# Patient Record
Sex: MALE | Race: WHITE | NOT HISPANIC OR LATINO | Employment: OTHER | RURAL
[De-identification: names, ages, dates, MRNs, and addresses within clinical notes are randomized per-mention and may not be internally consistent; named-entity substitution may affect disease eponyms.]

---

## 2018-11-27 DIAGNOSIS — Z00.6 EXAMINATION FOR NORMAL COMPARISON OR CONTROL IN CLINICAL RESEARCH: Primary | ICD-10-CM

## 2018-11-28 ENCOUNTER — OFFICE VISIT (OUTPATIENT)
Dept: CARDIAC SURGERY | Facility: CLINIC | Age: 73
End: 2018-11-28

## 2018-11-28 VITALS
SYSTOLIC BLOOD PRESSURE: 200 MMHG | BODY MASS INDEX: 32.1 KG/M2 | DIASTOLIC BLOOD PRESSURE: 90 MMHG | HEIGHT: 72 IN | WEIGHT: 237 LBS | HEART RATE: 66 BPM

## 2018-11-28 DIAGNOSIS — R91.8 LUNG MASS: Primary | ICD-10-CM

## 2018-11-28 DIAGNOSIS — R59.0 MEDIASTINAL LYMPHADENOPATHY: ICD-10-CM

## 2018-11-28 PROCEDURE — 99204 OFFICE O/P NEW MOD 45 MIN: CPT | Performed by: THORACIC SURGERY (CARDIOTHORACIC VASCULAR SURGERY)

## 2018-11-28 RX ORDER — ERGOCALCIFEROL 1.25 MG/1
50000 CAPSULE ORAL WEEKLY
COMMUNITY

## 2018-11-28 RX ORDER — SIMVASTATIN 40 MG
40 TABLET ORAL NIGHTLY
COMMUNITY

## 2018-11-28 RX ORDER — LOSARTAN POTASSIUM 25 MG/1
50 TABLET ORAL DAILY
COMMUNITY

## 2018-11-28 NOTE — PROGRESS NOTES
11/28/2018  Patient Information  Marek Gudino                                                                                          [address] [facility] [address]   1945  'PCP/Referring Physician'  Provider, No Known  None  No ref. provider found    Chief Complaint   Patient presents with   • Consult     Np referred for a chest wall mass, complains of a cough and some shortness of breath.    • Shortness of Breath   • Cough       History of Present Illness:   The patient is a 73-year-old male who is referred from Dr. Ulloa for a right hilar and mediastinal mass.  This was positive on PET scan.  He also has a exophytic mass in his right kidney concerning for malignancy.  He has a previous history of colon cancer and had a colostomy approximately 11 years ago.  He denies any weight loss or fever and chills or other systemic symptoms.  Patient Active Problem List   Diagnosis   • Mediastinal lymphadenopathy   • Lung mass     Past Medical History:   Diagnosis Date   • Colorectal cancer (CMS/HCC)    • Dyslipidemia    • Hypertension    • Kidney mass    • Mediastinal lymphadenopathy    • Pneumonia      Past Surgical History:   Procedure Laterality Date   • BRONCHOSCOPY     • COLON RESECTION     • MEDIASTINOSCOPY     • RECTAL SURGERY         Current Outpatient Medications:   •  Cetirizine HCl 10 MG capsule, Take 10 mg by mouth Daily., Disp: , Rfl:   •  losartan (COZAAR) 25 MG tablet, Take 25 mg by mouth Daily., Disp: , Rfl:   •  Pyridoxine HCl (VITAMIN B6) 200 MG tablet, Take 200 mg by mouth Daily., Disp: , Rfl:   •  simvastatin (ZOCOR) 40 MG tablet, Take 40 mg by mouth Every Night., Disp: , Rfl:   •  vitamin D (ERGOCALCIFEROL) 62633 units capsule capsule, Take 50,000 Units by mouth 1 (One) Time Per Week., Disp: , Rfl:   Allergies   Allergen Reactions   • Contrast Dye Other (See Comments)     States that it makes him very jittery. Shakes badly     Social History     Socioeconomic History   • Marital  status:      Spouse name: Not on file   • Number of children: 1   • Years of education: Not on file   • Highest education level: Not on file   Social Needs   • Financial resource strain: Not on file   • Food insecurity - worry: Not on file   • Food insecurity - inability: Not on file   • Transportation needs - medical: Not on file   • Transportation needs - non-medical: Not on file   Occupational History   • Occupation: FARMER   Tobacco Use   • Smoking status: Never Smoker   • Smokeless tobacco: Never Used   Substance and Sexual Activity   • Alcohol use: No     Frequency: Never   • Drug use: No   • Sexual activity: Defer   Other Topics Concern   • Not on file   Social History Narrative    Lives in [facility] with his wife.     Family History   Problem Relation Age of Onset   • Lung cancer Mother    • Lung cancer Father      Review of Systems   Constitution: Negative for chills, fever, malaise/fatigue, night sweats and weight loss.   HENT: Negative for hearing loss, odynophagia and sore throat.    Cardiovascular: Positive for dyspnea on exertion and syncope. Negative for chest pain, leg swelling, orthopnea and palpitations.   Respiratory: Positive for cough and shortness of breath. Negative for hemoptysis.    Endocrine: Negative for cold intolerance, heat intolerance, polydipsia, polyphagia and polyuria.   Hematologic/Lymphatic: Does not bruise/bleed easily.   Skin: Negative for itching and rash.   Musculoskeletal: Positive for joint pain. Negative for joint swelling and myalgias.   Gastrointestinal: Negative for abdominal pain, constipation, diarrhea, hematemesis, hematochezia, melena, nausea and vomiting.   Genitourinary: Negative for dysuria, frequency and hematuria.   Neurological: Negative for focal weakness, headaches, numbness and seizures.   Psychiatric/Behavioral: Negative for suicidal ideas.   Allergic/Immunologic: Positive for environmental allergies.   All other systems reviewed and are  "negative.    Vitals:    11/28/18 0835   BP: (!) 200/90   BP Location: Right arm   Patient Position: Sitting   Pulse: 66   Weight: 108 kg (237 lb)   Height: 182.9 cm (72\")      Physical Exam   Constitutional: He is oriented to person, place, and time. He appears well-developed and well-nourished. No distress.   HENT:   Head: Normocephalic.   Eyes: EOM are normal. Pupils are equal, round, and reactive to light.   Neck: Normal range of motion. Carotid bruit is not present. No thyromegaly present.   Cardiovascular: Normal rate and regular rhythm. Exam reveals no gallop and no friction rub.   No murmur heard.  Pulmonary/Chest: He has no wheezes. He has no rales.   Abdominal: Soft. Bowel sounds are normal. He exhibits no distension and no mass. There is no hepatomegaly. There is no tenderness.   Musculoskeletal: Normal range of motion. He exhibits no deformity.   Neurological: He is alert and oriented to person, place, and time. He has normal strength. No cranial nerve deficit or sensory deficit.   Skin: No bruising and no petechiae noted. No cyanosis. Nails show no clubbing.   Psychiatric: He has a normal mood and affect.       Labs/Imaging:  I obtained and reviewed medical records from Dr. Ulloa including the PET scan demonstrating the mass in his right kidney, which is certainly concerning for malignancy.      Assessment/Plan:   The patient is a 73-year-old male who was referred for a lung mass and mediastinal lymphadenopathy. I have obtained and reviewed his PET scan and gone over itin detail.  He has a mass in his right kidney, which is certainly concerning for malignancy.  He will be seen at [facility] for this.  I have reviewed his PET scan.  He has a right hilar hypermetabolic area in the mediastinum as well.  This certainly is concerning for malignancy.  We will proceed with an EBUS study to further assess this.  He has had a mediastinoscopy in the past by me.  I discussed the procedure, risks, and " alternatives.  He appears to be fully informed and desires to proceed.    Patient Active Problem List   Diagnosis   • Mediastinal lymphadenopathy   • Lung mass     CC: MD Darvin Linn MD Debbie Moore, , editing for Gianni Cruz M.D.    I, Gianni Cruz MD, have read and agree with the editing done by Shahnaz Cuadra, .

## 2018-11-29 DIAGNOSIS — Z00.6 EXAMINATION FOR NORMAL COMPARISON FOR CLINICAL RESEARCH: Primary | ICD-10-CM

## 2018-11-29 PROBLEM — R59.0 MEDIASTINAL LYMPHADENOPATHY: Status: ACTIVE | Noted: 2018-11-29

## 2018-11-29 PROBLEM — R91.8 LUNG MASS: Status: ACTIVE | Noted: 2018-11-29

## 2018-12-03 ENCOUNTER — PREP FOR SURGERY (OUTPATIENT)
Dept: OTHER | Facility: HOSPITAL | Age: 73
End: 2018-12-03

## 2018-12-03 DIAGNOSIS — R59.0 MEDIASTINAL LYMPHADENOPATHY: Primary | ICD-10-CM

## 2018-12-04 ENCOUNTER — HOSPITAL ENCOUNTER (OUTPATIENT)
Dept: GENERAL RADIOLOGY | Facility: HOSPITAL | Age: 73
Discharge: HOME | End: 2018-12-04
Admitting: PHYSICIAN ASSISTANT

## 2018-12-04 ENCOUNTER — APPOINTMENT (OUTPATIENT)
Dept: PREADMISSION TESTING | Facility: HOSPITAL | Age: 73
End: 2018-12-04

## 2018-12-04 VITALS — BODY MASS INDEX: 32.07 KG/M2 | HEIGHT: 72 IN | WEIGHT: 236.8 LBS

## 2018-12-04 DIAGNOSIS — R59.0 MEDIASTINAL LYMPHADENOPATHY: ICD-10-CM

## 2018-12-04 LAB
ALBUMIN SERPL-MCNC: 4.4 G/DL (ref 3.2–4.8)
ALBUMIN/GLOB SERPL: 2.2 G/DL (ref 1.5–2.5)
ALP SERPL-CCNC: 78 U/L (ref 25–100)
ALT SERPL W P-5'-P-CCNC: 28 U/L (ref 7–40)
ANION GAP SERPL CALCULATED.3IONS-SCNC: 6 MMOL/L (ref 3–11)
APTT PPP: 30.3 SECONDS (ref 24–31)
AST SERPL-CCNC: 28 U/L (ref 0–33)
BILIRUB SERPL-MCNC: 0.8 MG/DL (ref 0.3–1.2)
BUN BLD-MCNC: 14 MG/DL (ref 9–23)
BUN/CREAT SERPL: 11.7 (ref 7–25)
CALCIUM SPEC-SCNC: 9 MG/DL (ref 8.7–10.4)
CHLORIDE SERPL-SCNC: 109 MMOL/L (ref 99–109)
CO2 SERPL-SCNC: 23 MMOL/L (ref 20–31)
CREAT BLD-MCNC: 1.2 MG/DL (ref 0.6–1.3)
DEPRECATED RDW RBC AUTO: 44.1 FL (ref 37–54)
ERYTHROCYTE [DISTWIDTH] IN BLOOD BY AUTOMATED COUNT: 13.4 % (ref 11.3–14.5)
GFR SERPL CREATININE-BSD FRML MDRD: 60 ML/MIN/1.73
GLOBULIN UR ELPH-MCNC: 2 GM/DL
GLUCOSE BLD-MCNC: 101 MG/DL (ref 70–100)
HBA1C MFR BLD: 6.2 % (ref 4.8–5.6)
HCT VFR BLD AUTO: 43.1 % (ref 38.9–50.9)
HGB BLD-MCNC: 14.4 G/DL (ref 13.1–17.5)
INR PPP: 1.03 (ref 0.91–1.09)
MCH RBC QN AUTO: 30.3 PG (ref 27–31)
MCHC RBC AUTO-ENTMCNC: 33.4 G/DL (ref 32–36)
MCV RBC AUTO: 90.5 FL (ref 80–99)
PLATELET # BLD AUTO: 196 10*3/MM3 (ref 150–450)
PMV BLD AUTO: 10.3 FL (ref 6–12)
POTASSIUM BLD-SCNC: 4.1 MMOL/L (ref 3.5–5.5)
PROT SERPL-MCNC: 6.4 G/DL (ref 5.7–8.2)
PROTHROMBIN TIME: 10.8 SECONDS (ref 9.6–11.5)
RBC # BLD AUTO: 4.76 10*6/MM3 (ref 4.2–5.76)
SODIUM BLD-SCNC: 138 MMOL/L (ref 132–146)
WBC NRBC COR # BLD: 5.95 10*3/MM3 (ref 3.5–10.8)

## 2018-12-04 PROCEDURE — 71046 X-RAY EXAM CHEST 2 VIEWS: CPT

## 2018-12-04 PROCEDURE — 36415 COLL VENOUS BLD VENIPUNCTURE: CPT

## 2018-12-04 PROCEDURE — 85610 PROTHROMBIN TIME: CPT | Performed by: PHYSICIAN ASSISTANT

## 2018-12-04 PROCEDURE — 83036 HEMOGLOBIN GLYCOSYLATED A1C: CPT | Performed by: PHYSICIAN ASSISTANT

## 2018-12-04 PROCEDURE — 80053 COMPREHEN METABOLIC PANEL: CPT | Performed by: PHYSICIAN ASSISTANT

## 2018-12-04 PROCEDURE — 85730 THROMBOPLASTIN TIME PARTIAL: CPT | Performed by: PHYSICIAN ASSISTANT

## 2018-12-04 PROCEDURE — 85027 COMPLETE CBC AUTOMATED: CPT | Performed by: PHYSICIAN ASSISTANT

## 2018-12-05 ENCOUNTER — ANESTHESIA EVENT (OUTPATIENT)
Dept: GASTROENTEROLOGY | Facility: HOSPITAL | Age: 73
End: 2018-12-05

## 2018-12-05 ENCOUNTER — ANESTHESIA (OUTPATIENT)
Dept: GASTROENTEROLOGY | Facility: HOSPITAL | Age: 73
End: 2018-12-05

## 2018-12-05 ENCOUNTER — HOSPITAL ENCOUNTER (OUTPATIENT)
Facility: HOSPITAL | Age: 73
Setting detail: HOSPITAL OUTPATIENT SURGERY
Discharge: HOME | End: 2018-12-05
Attending: THORACIC SURGERY (CARDIOTHORACIC VASCULAR SURGERY) | Admitting: THORACIC SURGERY (CARDIOTHORACIC VASCULAR SURGERY)

## 2018-12-05 ENCOUNTER — APPOINTMENT (OUTPATIENT)
Dept: GENERAL RADIOLOGY | Facility: HOSPITAL | Age: 73
End: 2018-12-05

## 2018-12-05 VITALS
RESPIRATION RATE: 18 BRPM | TEMPERATURE: 97.2 F | DIASTOLIC BLOOD PRESSURE: 76 MMHG | HEART RATE: 60 BPM | OXYGEN SATURATION: 90 % | SYSTOLIC BLOOD PRESSURE: 134 MMHG

## 2018-12-05 DIAGNOSIS — R59.0 MEDIASTINAL LYMPHADENOPATHY: ICD-10-CM

## 2018-12-05 PROCEDURE — C1726 CATH, BAL DIL, NON-VASCULAR: HCPCS | Performed by: THORACIC SURGERY (CARDIOTHORACIC VASCULAR SURGERY)

## 2018-12-05 PROCEDURE — 25010000002 DEXAMETHASONE PER 1 MG: Performed by: NURSE ANESTHETIST, CERTIFIED REGISTERED

## 2018-12-05 PROCEDURE — 25010000002 FENTANYL CITRATE (PF) 100 MCG/2ML SOLUTION: Performed by: NURSE ANESTHETIST, CERTIFIED REGISTERED

## 2018-12-05 PROCEDURE — 25010000002 ONDANSETRON PER 1 MG: Performed by: NURSE ANESTHETIST, CERTIFIED REGISTERED

## 2018-12-05 PROCEDURE — 87070 CULTURE OTHR SPECIMN AEROBIC: CPT | Performed by: THORACIC SURGERY (CARDIOTHORACIC VASCULAR SURGERY)

## 2018-12-05 PROCEDURE — S0260 H&P FOR SURGERY: HCPCS | Performed by: THORACIC SURGERY (CARDIOTHORACIC VASCULAR SURGERY)

## 2018-12-05 PROCEDURE — 25010000002 PROPOFOL 10 MG/ML EMULSION: Performed by: NURSE ANESTHETIST, CERTIFIED REGISTERED

## 2018-12-05 PROCEDURE — 71045 X-RAY EXAM CHEST 1 VIEW: CPT

## 2018-12-05 PROCEDURE — 88173 CYTOPATH EVAL FNA REPORT: CPT | Performed by: THORACIC SURGERY (CARDIOTHORACIC VASCULAR SURGERY)

## 2018-12-05 PROCEDURE — 25010000002 NEOSTIGMINE 10 MG/10ML SOLUTION: Performed by: NURSE ANESTHETIST, CERTIFIED REGISTERED

## 2018-12-05 PROCEDURE — 31652 BRONCH EBUS SAMPLNG 1/2 NODE: CPT | Performed by: THORACIC SURGERY (CARDIOTHORACIC VASCULAR SURGERY)

## 2018-12-05 PROCEDURE — 88112 CYTOPATH CELL ENHANCE TECH: CPT | Performed by: THORACIC SURGERY (CARDIOTHORACIC VASCULAR SURGERY)

## 2018-12-05 RX ORDER — SODIUM CHLORIDE 0.9 % (FLUSH) 0.9 %
1-10 SYRINGE (ML) INJECTION AS NEEDED
Status: DISCONTINUED | OUTPATIENT
Start: 2018-12-05 | End: 2018-12-05 | Stop reason: HOSPADM

## 2018-12-05 RX ORDER — LIDOCAINE HYDROCHLORIDE 10 MG/ML
0.5 INJECTION, SOLUTION EPIDURAL; INFILTRATION; INTRACAUDAL; PERINEURAL ONCE AS NEEDED
Status: DISCONTINUED | OUTPATIENT
Start: 2018-12-05 | End: 2018-12-05 | Stop reason: HOSPADM

## 2018-12-05 RX ORDER — SODIUM CHLORIDE 0.9 % (FLUSH) 0.9 %
3 SYRINGE (ML) INJECTION EVERY 12 HOURS SCHEDULED
Status: DISCONTINUED | OUTPATIENT
Start: 2018-12-05 | End: 2018-12-05 | Stop reason: HOSPADM

## 2018-12-05 RX ORDER — PROMETHAZINE HYDROCHLORIDE 25 MG/1
25 SUPPOSITORY RECTAL ONCE AS NEEDED
Status: DISCONTINUED | OUTPATIENT
Start: 2018-12-05 | End: 2018-12-05 | Stop reason: HOSPADM

## 2018-12-05 RX ORDER — PROMETHAZINE HYDROCHLORIDE 25 MG/ML
6.25 INJECTION, SOLUTION INTRAMUSCULAR; INTRAVENOUS ONCE AS NEEDED
Status: DISCONTINUED | OUTPATIENT
Start: 2018-12-05 | End: 2018-12-05 | Stop reason: HOSPADM

## 2018-12-05 RX ORDER — FAMOTIDINE 20 MG/1
20 TABLET, FILM COATED ORAL
Status: DISCONTINUED | OUTPATIENT
Start: 2018-12-05 | End: 2018-12-05 | Stop reason: HOSPADM

## 2018-12-05 RX ORDER — SODIUM CHLORIDE 9 MG/ML
9 INJECTION, SOLUTION INTRAVENOUS CONTINUOUS
Status: DISCONTINUED | OUTPATIENT
Start: 2018-12-05 | End: 2018-12-05 | Stop reason: HOSPADM

## 2018-12-05 RX ORDER — FENTANYL CITRATE 50 UG/ML
25 INJECTION, SOLUTION INTRAMUSCULAR; INTRAVENOUS
Status: DISCONTINUED | OUTPATIENT
Start: 2018-12-05 | End: 2018-12-05 | Stop reason: HOSPADM

## 2018-12-05 RX ORDER — FENTANYL CITRATE 50 UG/ML
INJECTION, SOLUTION INTRAMUSCULAR; INTRAVENOUS AS NEEDED
Status: DISCONTINUED | OUTPATIENT
Start: 2018-12-05 | End: 2018-12-05 | Stop reason: SURG

## 2018-12-05 RX ORDER — LIDOCAINE HYDROCHLORIDE 10 MG/ML
INJECTION, SOLUTION EPIDURAL; INFILTRATION; INTRACAUDAL; PERINEURAL AS NEEDED
Status: DISCONTINUED | OUTPATIENT
Start: 2018-12-05 | End: 2018-12-05 | Stop reason: SURG

## 2018-12-05 RX ORDER — NEOSTIGMINE METHYLSULFATE 1 MG/ML
INJECTION, SOLUTION INTRAVENOUS AS NEEDED
Status: DISCONTINUED | OUTPATIENT
Start: 2018-12-05 | End: 2018-12-05 | Stop reason: SURG

## 2018-12-05 RX ORDER — LABETALOL HYDROCHLORIDE 5 MG/ML
5 INJECTION, SOLUTION INTRAVENOUS
Status: DISCONTINUED | OUTPATIENT
Start: 2018-12-05 | End: 2018-12-05 | Stop reason: HOSPADM

## 2018-12-05 RX ORDER — PROMETHAZINE HYDROCHLORIDE 25 MG/1
25 TABLET ORAL ONCE AS NEEDED
Status: DISCONTINUED | OUTPATIENT
Start: 2018-12-05 | End: 2018-12-05 | Stop reason: HOSPADM

## 2018-12-05 RX ORDER — IPRATROPIUM BROMIDE AND ALBUTEROL SULFATE 2.5; .5 MG/3ML; MG/3ML
3 SOLUTION RESPIRATORY (INHALATION) ONCE AS NEEDED
Status: DISCONTINUED | OUTPATIENT
Start: 2018-12-05 | End: 2018-12-05 | Stop reason: HOSPADM

## 2018-12-05 RX ORDER — ATRACURIUM BESYLATE 10 MG/ML
INJECTION, SOLUTION INTRAVENOUS AS NEEDED
Status: DISCONTINUED | OUTPATIENT
Start: 2018-12-05 | End: 2018-12-05 | Stop reason: SURG

## 2018-12-05 RX ORDER — ONDANSETRON 2 MG/ML
INJECTION INTRAMUSCULAR; INTRAVENOUS AS NEEDED
Status: DISCONTINUED | OUTPATIENT
Start: 2018-12-05 | End: 2018-12-05 | Stop reason: SURG

## 2018-12-05 RX ORDER — SODIUM CHLORIDE, SODIUM LACTATE, POTASSIUM CHLORIDE, CALCIUM CHLORIDE 600; 310; 30; 20 MG/100ML; MG/100ML; MG/100ML; MG/100ML
9 INJECTION, SOLUTION INTRAVENOUS CONTINUOUS PRN
Status: DISCONTINUED | OUTPATIENT
Start: 2018-12-05 | End: 2018-12-05 | Stop reason: HOSPADM

## 2018-12-05 RX ORDER — PROPOFOL 10 MG/ML
VIAL (ML) INTRAVENOUS AS NEEDED
Status: DISCONTINUED | OUTPATIENT
Start: 2018-12-05 | End: 2018-12-05 | Stop reason: SURG

## 2018-12-05 RX ORDER — DEXAMETHASONE SODIUM PHOSPHATE 10 MG/ML
INJECTION INTRAMUSCULAR; INTRAVENOUS AS NEEDED
Status: DISCONTINUED | OUTPATIENT
Start: 2018-12-05 | End: 2018-12-05 | Stop reason: SURG

## 2018-12-05 RX ORDER — GLYCOPYRROLATE 0.2 MG/ML
INJECTION INTRAMUSCULAR; INTRAVENOUS AS NEEDED
Status: DISCONTINUED | OUTPATIENT
Start: 2018-12-05 | End: 2018-12-05 | Stop reason: SURG

## 2018-12-05 RX ORDER — ONDANSETRON 2 MG/ML
4 INJECTION INTRAMUSCULAR; INTRAVENOUS ONCE AS NEEDED
Status: DISCONTINUED | OUTPATIENT
Start: 2018-12-05 | End: 2018-12-05 | Stop reason: HOSPADM

## 2018-12-05 RX ADMIN — PROPOFOL 200 MG: 10 INJECTION, EMULSION INTRAVENOUS at 10:36

## 2018-12-05 RX ADMIN — FENTANYL CITRATE 50 MCG: 50 INJECTION, SOLUTION INTRAMUSCULAR; INTRAVENOUS at 10:40

## 2018-12-05 RX ADMIN — DEXAMETHASONE SODIUM PHOSPHATE 8 MG: 10 INJECTION INTRAMUSCULAR; INTRAVENOUS at 10:45

## 2018-12-05 RX ADMIN — NEOSTIGMINE METHYLSULFATE 3 MG: 1 INJECTION, SOLUTION INTRAVENOUS at 11:01

## 2018-12-05 RX ADMIN — ATRACURIUM BESYLATE 30 MG: 10 INJECTION, SOLUTION INTRAVENOUS at 10:36

## 2018-12-05 RX ADMIN — SODIUM CHLORIDE 9 ML/HR: 9 INJECTION, SOLUTION INTRAVENOUS at 09:06

## 2018-12-05 RX ADMIN — GLYCOPYRROLATE 0.4 MG: 0.2 INJECTION, SOLUTION INTRAMUSCULAR; INTRAVENOUS at 11:01

## 2018-12-05 RX ADMIN — ONDANSETRON 4 MG: 2 INJECTION INTRAMUSCULAR; INTRAVENOUS at 11:00

## 2018-12-05 RX ADMIN — LIDOCAINE HYDROCHLORIDE 50 MG: 10 INJECTION, SOLUTION EPIDURAL; INFILTRATION; INTRACAUDAL; PERINEURAL at 10:36

## 2018-12-05 RX ADMIN — SODIUM CHLORIDE, POTASSIUM CHLORIDE, SODIUM LACTATE AND CALCIUM CHLORIDE: 600; 310; 30; 20 INJECTION, SOLUTION INTRAVENOUS at 08:52

## 2018-12-05 RX ADMIN — FAMOTIDINE 20 MG: 20 TABLET ORAL at 09:03

## 2018-12-05 NOTE — OP NOTE
Operative Report      Marek Gudino    : 1945  MRN: [id number]  [facility]: [id number]      Date:18      Preop Diagnosis: Mediastinal lymphadenopathy, right lower lobe airspace disease      Postop Diagnosis: Mediastinal lymphadenopathy, right lower lobe airspace disease      Procedure:   #1 bronchoscopy with brushing of the superior segment right lower lobe  #2 endobronchial ultrasound with biopsy of 4R mediastinal lymph node      Surgeon: Gurvinder Grajeda MD      Assistant: None      Indications: 73-year-old  male with a known right renal mass right lower lobe airspace disease as enlarged mediastinal nodes near the right hilum on CT scan.  PET scan reveals these nodes to be metabolically active..  It should be noted that the patient has had a previous mediastinoscopy and bronchoscopy for a similar problem 10 years ago.  No malignancy was noted at that time.      Operative Findings: Narrowing of the superior segment right lower lobe takeoff with friability of the mucosa was noted.  Washings and brushings were obtained from this area.  A level IVR lymph node was identified and multiple needle biopsies were obtained.      EBL:5cc      Operative Narrative: The patient was brought to the endoscopy suite and prepared for intervention in the usual manner.  A timeout was called.  The patient's identity, the proposed procedure, and the availability of appropriate equipment and personnel was verified.  Following inhalation endotracheal anesthesia the fiberoptic bronchoscope was introduced.  The trachea was normal the sukumar was sharp and in the midline. The right mainstem bronchus, bronchus intermedius, right upper lobe, right middle lobe, and right lower lobe with all respective segmental takeoffs was inspected.  The superior segment takeoff of the right lower lobe appeared to be narrowed.  Washings, cytology brushing, and microbiology brushing were obtained from the orifice and from the superior segment  of the right lower lobe.  Washings were sent for cultures and cytology brushings were sent for cultures and cytology.  Right upper lobe right middle lobe and remaining segments of the right lower lobe were normal.  The scope was pulled back to the sukumar and passed down the left side.  The left mainstem, left upper lobe, lingula, and left lower lobe with all segmental takeoffs was inspected and noted to be normal.  The bronchoscope was removed. The endobronchial ultrasound scope was introduced.  Survey of the paratracheal regions, the subcarinal region, and level 10 regions bilaterally revealed the enlarged lymph node at level IVR..  Needle introduction was performed and multiple needle biopsies of the forearm lymph node (10×4 passages) were performed.  The specimens were sent as previously described.  Inspection revealed no evidence of bleeding.  The scope was withdrawn.  The patient was awakened and sent to recovery in stable condition.          Gurvinder Grajeda MD  CTSurgery  12/05/18   11:38 AM

## 2018-12-05 NOTE — H&P
CTS H&P        History of Present Illness:   The patient is a 73-year-old male who is referred from Dr. Ulloa for a right hilar and mediastinal mass.  This was positive on PET scan.  He also has a exophytic mass in his right kidney concerning for malignancy.  He has a previous history of colon cancer and had a colostomy approximately 11 years ago.  He denies any weight loss or fever and chills or other systemic symptoms.          Patient Active Problem List   Diagnosis   • Mediastinal lymphadenopathy   • Lung mass      Medical History        Past Medical History:   Diagnosis Date   • Colorectal cancer (CMS/HCC)     • Dyslipidemia     • Hypertension     • Kidney mass     • Mediastinal lymphadenopathy     • Pneumonia           Surgical History         Past Surgical History:   Procedure Laterality Date   • BRONCHOSCOPY       • COLON RESECTION       • MEDIASTINOSCOPY       • RECTAL SURGERY                Current Outpatient Medications:   •  Cetirizine HCl 10 MG capsule, Take 10 mg by mouth Daily., Disp: , Rfl:   •  losartan (COZAAR) 25 MG tablet, Take 25 mg by mouth Daily., Disp: , Rfl:   •  Pyridoxine HCl (VITAMIN B6) 200 MG tablet, Take 200 mg by mouth Daily., Disp: , Rfl:   •  simvastatin (ZOCOR) 40 MG tablet, Take 40 mg by mouth Every Night., Disp: , Rfl:   •  vitamin D (ERGOCALCIFEROL) 79980 units capsule capsule, Take 50,000 Units by mouth 1 (One) Time Per Week., Disp: , Rfl:         Allergies   Allergen Reactions   • Contrast Dye Other (See Comments)       States that it makes him very jittery. Shakes badly      Social History               Socioeconomic History   • Marital status:        Spouse name: Not on file   • Number of children: 1   • Years of education: Not on file   • Highest education level: Not on file   Social Needs   • Financial resource strain: Not on file   • Food insecurity - worry: Not on file   • Food insecurity -  inability: Not on file   • Transportation needs - medical: Not on file   • Transportation needs - non-medical: Not on file   Occupational History   • Occupation: FARMER   Tobacco Use   • Smoking status: Never Smoker   • Smokeless tobacco: Never Used   Substance and Sexual Activity   • Alcohol use: No       Frequency: Never   • Drug use: No   • Sexual activity: Defer   Other Topics Concern   • Not on file   Social History Narrative     Lives in [facility] with his wife.               Family History   Problem Relation Age of Onset   • Lung cancer Mother     • Lung cancer Father        Review of Systems   Constitution: Negative for chills, fever, malaise/fatigue, night sweats and weight loss.   HENT: Negative for hearing loss, odynophagia and sore throat.    Cardiovascular: Positive for dyspnea on exertion and syncope. Negative for chest pain, leg swelling, orthopnea and palpitations.   Respiratory: Positive for cough and shortness of breath. Negative for hemoptysis.    Endocrine: Negative for cold intolerance, heat intolerance, polydipsia, polyphagia and polyuria.   Hematologic/Lymphatic: Does not bruise/bleed easily.   Skin: Negative for itching and rash.   Musculoskeletal: Positive for joint pain. Negative for joint swelling and myalgias.   Gastrointestinal: Negative for abdominal pain, constipation, diarrhea, hematemesis, hematochezia, melena, nausea and vomiting.   Genitourinary: Negative for dysuria, frequency and hematuria.   Neurological: Negative for focal weakness, headaches, numbness and seizures.   Psychiatric/Behavioral: Negative for suicidal ideas.   Allergic/Immunologic: Positive for environmental allergies.   All other systems reviewed and are negative.              Physical Exam   /102  P 61  R 16  Constitutional: He is oriented to person, place, and time. He appears well-developed and well-nourished. No distress.   HENT:   Head: Normocephalic.   Eyes: EOM are normal. Pupils are equal, round,  and reactive to light.   Neck: Normal range of motion. Carotid bruit is not present. No thyromegaly present.   Cardiovascular: Normal rate and regular rhythm. Exam reveals no gallop and no friction rub.   No murmur heard.  Pulmonary/Chest: He has no wheezes. He has no rales.   Abdominal: Soft. Bowel sounds are normal. He exhibits no distension and no mass. There is no hepatomegaly. There is no tenderness.   Musculoskeletal: Normal range of motion. He exhibits no deformity.   Neurological: He is alert and oriented to person, place, and time. He has normal strength. No cranial nerve deficit or sensory deficit.       Assessment:   Mediastinal adenopathy (metabollically active)  Right renal mass  History of rectal carcinoma    Plan: proceed with bronchoscopy and EBUS.  I have discussed the procedure in detail with patient.  He understands and agrees.      Gurvinder Grajeda MD  CTSurgery  12/05/18   10:23 AM

## 2018-12-05 NOTE — ANESTHESIA PREPROCEDURE EVALUATION
Anesthesia Evaluation     Patient summary reviewed and Nursing notes reviewed   NPO Solid Status: > 8 hours  NPO Liquid Status: > 8 hours           Airway   Mallampati: I  TM distance: >3 FB  Neck ROM: full  No difficulty expected  Dental      Pulmonary    (+) pneumonia resolved ,   (-) asthma, shortness of breath, recent URI, not a smoker    ROS comment: LUNG mass on PET scan after pneumonia  Cardiovascular     (+) hypertension, hyperlipidemia,   (-) past MI, CAD, dysrhythmias, angina, cardiac stents, CABG      Neuro/Psych  (-) seizures, CVA  GI/Hepatic/Renal/Endo    Renal disease: kidney N MASS w/u at [facility] R     Musculoskeletal     Abdominal    Substance History      OB/GYN          Other                        Anesthesia Plan    ASA 3     general     intravenous induction   Anesthetic plan, all risks, benefits, and alternatives have been provided, discussed and informed consent has been obtained with: patient.    Plan discussed with CRNA.

## 2018-12-05 NOTE — ANESTHESIA POSTPROCEDURE EVALUATION
Patient: Marek Gudino    Procedure Summary     Date:  12/05/18 Room / Location:   [facility] /  [facility]    Anesthesia Start:  1030 Anesthesia Stop:      Procedure:  BRONCHOSCOPY WITH ENDOBRONCHIAL ULTRASOUND biopsies (N/A Bronchus) Diagnosis:       Mediastinal lymphadenopathy      (Mediastinal lymphadenopathy [R59.0])    Surgeon:  Gurvinder Grajeda MD Provider:      Anesthesia Type:  general ASA Status:  3          Anesthesia Type: general  Last vitals  BP   115/86 (12/05/18 1113)   Temp   97.6 °F (36.4 °C) (12/05/18 1113)   Pulse   67 (12/05/18 1113)   Resp   21 (12/05/18 1113)     SpO2   97 % (12/05/18 1113)     Post Anesthesia Care and Evaluation    Patient location during evaluation: PACU  Patient participation: complete - patient participated  Level of consciousness: awake and responsive to verbal stimuli  Pain score: 0  Pain management: adequate  Airway patency: patent  Anesthetic complications: No anesthetic complications  PONV Status: none  Cardiovascular status: stable  Respiratory status: acceptable, nasal cannula and spontaneous ventilation  Hydration status: stable    Comments: Pt transferred to PACU with O2. Vital signs stable. Report to PACU RN and care accepted.

## 2018-12-05 NOTE — ANESTHESIA PROCEDURE NOTES
Airway  Urgency: elective    Airway not difficult    General Information and Staff    Patient location during procedure: OR  CRNA: Susan Dang CRNA    Indications and Patient Condition  Indications for airway management: airway protection    Preoxygenated: yes  MILS maintained throughout  Mask difficulty assessment: 2 - vent by mask + OA or adjuvant +/- NMBA    Final Airway Details  Final airway type: endotracheal airway      Successful airway: ETT  Cuffed: yes   Successful intubation technique: direct laryngoscopy  Endotracheal tube insertion site: oral  Blade: Lewis  Blade size: 2  ETT size (mm): 9.0  Cormack-Lehane Classification: grade I - full view of glottis  Placement verified by: chest auscultation and capnometry   Cuff volume (mL): 6  Measured from: lips  ETT to lips (cm): 22  Number of attempts at approach: 1    Additional Comments  Pt to ENDO 2.  Pt supine on stretcher. Bilateral arms to side.  ASA monitors placed. Pre-O2 with 100% Oxygen. SIVI. Atraumatic intubation.

## 2018-12-06 LAB
LAB AP CASE REPORT: NORMAL
PATH REPORT.FINAL DX SPEC: NORMAL

## 2018-12-07 LAB
BACTERIA SPEC AEROBE CULT: NORMAL
CYTO UR: NORMAL
LAB AP CASE REPORT: NORMAL
LAB AP CLINICAL INFORMATION: NORMAL
PATH REPORT.FINAL DX SPEC: NORMAL
PATH REPORT.GROSS SPEC: NORMAL

## 2018-12-27 ENCOUNTER — OFFICE VISIT (OUTPATIENT)
Dept: CARDIAC SURGERY | Facility: CLINIC | Age: 73
End: 2018-12-27

## 2018-12-27 VITALS
WEIGHT: 248.2 LBS | SYSTOLIC BLOOD PRESSURE: 140 MMHG | HEART RATE: 56 BPM | BODY MASS INDEX: 33.66 KG/M2 | DIASTOLIC BLOOD PRESSURE: 80 MMHG | TEMPERATURE: 98.1 F | OXYGEN SATURATION: 95 %

## 2018-12-27 DIAGNOSIS — N28.89 RENAL MASS: ICD-10-CM

## 2018-12-27 DIAGNOSIS — R59.0 MEDIASTINAL LYMPHADENOPATHY: Primary | ICD-10-CM

## 2018-12-27 PROCEDURE — 99212 OFFICE O/P EST SF 10 MIN: CPT | Performed by: THORACIC SURGERY (CARDIOTHORACIC VASCULAR SURGERY)

## 2019-04-15 ENCOUNTER — TELEPHONE (OUTPATIENT)
Dept: CARDIAC SURGERY | Facility: CLINIC | Age: 74
End: 2019-04-15

## 2019-05-02 ENCOUNTER — TELEPHONE (OUTPATIENT)
Dept: CARDIAC SURGERY | Facility: CLINIC | Age: 74
End: 2019-05-02

## 2021-06-04 ENCOUNTER — HOSPITAL ENCOUNTER (OUTPATIENT)
Age: 76
End: 2021-06-04
Payer: MEDICARE

## 2021-06-04 DIAGNOSIS — Z11.52: ICD-10-CM

## 2021-06-04 DIAGNOSIS — Z01.812: Primary | ICD-10-CM

## 2021-06-04 PROCEDURE — U0003 INFECTIOUS AGENT DETECTION BY NUCLEIC ACID (DNA OR RNA); SEVERE ACUTE RESPIRATORY SYNDROME CORONAVIRUS 2 (SARS-COV-2) (CORONAVIRUS DISEASE [COVID-19]), AMPLIFIED PROBE TECHNIQUE, MAKING USE OF HIGH THROUGHPUT TECHNOLOGIES AS DESCRIBED BY CMS-2020-01-R: HCPCS

## 2023-04-04 ENCOUNTER — HOSPITAL ENCOUNTER (OUTPATIENT)
Age: 78
End: 2023-04-04
Payer: MEDICARE

## 2023-04-04 DIAGNOSIS — C64.1: ICD-10-CM

## 2023-04-04 DIAGNOSIS — M25.511: Primary | ICD-10-CM

## 2023-04-04 PROCEDURE — 78306 BONE IMAGING WHOLE BODY: CPT

## 2023-04-04 PROCEDURE — A9576 INJ PROHANCE MULTIPACK: HCPCS

## 2023-04-04 PROCEDURE — 73223 MRI JOINT UPR EXTR W/O&W/DYE: CPT

## 2023-04-04 PROCEDURE — A9503 TC99M MEDRONATE: HCPCS

## 2023-04-04 PROCEDURE — 73060 X-RAY EXAM OF HUMERUS: CPT

## 2023-04-04 PROCEDURE — 73030 X-RAY EXAM OF SHOULDER: CPT

## 2023-09-05 ENCOUNTER — HOSPITAL ENCOUNTER (EMERGENCY)
Age: 78
Discharge: HOME | End: 2023-09-05
Payer: MEDICARE

## 2023-09-05 VITALS
RESPIRATION RATE: 18 BRPM | HEART RATE: 69 BPM | TEMPERATURE: 97.8 F | OXYGEN SATURATION: 97 % | DIASTOLIC BLOOD PRESSURE: 78 MMHG | SYSTOLIC BLOOD PRESSURE: 128 MMHG

## 2023-09-05 VITALS
OXYGEN SATURATION: 96 % | DIASTOLIC BLOOD PRESSURE: 81 MMHG | TEMPERATURE: 97.8 F | RESPIRATION RATE: 18 BRPM | HEART RATE: 80 BPM | SYSTOLIC BLOOD PRESSURE: 149 MMHG

## 2023-09-05 VITALS
RESPIRATION RATE: 18 BRPM | DIASTOLIC BLOOD PRESSURE: 75 MMHG | OXYGEN SATURATION: 95 % | SYSTOLIC BLOOD PRESSURE: 123 MMHG | HEART RATE: 72 BPM

## 2023-09-05 VITALS
OXYGEN SATURATION: 97 % | RESPIRATION RATE: 16 BRPM | SYSTOLIC BLOOD PRESSURE: 130 MMHG | DIASTOLIC BLOOD PRESSURE: 77 MMHG | HEART RATE: 73 BPM

## 2023-09-05 VITALS — BODY MASS INDEX: 27.8 KG/M2

## 2023-09-05 DIAGNOSIS — C64.9: ICD-10-CM

## 2023-09-05 DIAGNOSIS — R04.0: Primary | ICD-10-CM

## 2023-09-05 DIAGNOSIS — C79.51: ICD-10-CM

## 2023-09-05 LAB
ALBUMIN LEVEL: 3.8 G/DL (ref 3.5–5)
ALBUMIN/GLOB SERPL: 1.2 {RATIO} (ref 1.1–1.8)
ALP ISO SERPL-ACNC: 125 U/L (ref 38–126)
ALT SERPLBLD-CCNC: 23 U/L (ref 12–78)
ANION GAP SERPL CALC-SCNC: 12.3 MEQ/L (ref 5–15)
AST SERPL QL: 30 U/L (ref 17–59)
BILIRUBIN,TOTAL: 0.6 MG/DL (ref 0.2–1.3)
BUN SERPL-MCNC: 21 MG/DL (ref 9–20)
CALCIUM SPEC-MCNC: 9.3 MG/DL (ref 8.4–10.2)
CHLORIDE SPEC-SCNC: 108 MMOL/L (ref 98–107)
CO2 SERPL-SCNC: 25 MMOL/L (ref 22–30)
CREAT BLD-SCNC: 2 MG/DL (ref 0.66–1.25)
CREATININE CLEARANCE ESTIMATED: 40 ML/MIN (ref 50–200)
ESTIMATED GLOMERULAR FILT RATE: 33 ML/MIN (ref 60–?)
GFR (AFRICAN AMERICAN): 40 ML/MIN (ref 60–?)
GLOBULIN SER CALC-MCNC: 3.1 G/DL (ref 1.3–3.2)
GLUCOSE: 104 MG/DL (ref 74–100)
HCT VFR BLD CALC: 35.9 % (ref 42–52)
HGB BLD-MCNC: 12.5 G/DL (ref 14.1–18)
INR PPP: 1.02 (ref 0.9–1.1)
MCHC RBC-ENTMCNC: 34.9 G/DL (ref 31.8–35.4)
MCV RBC: 97.5 FL (ref 80–94)
MEAN CORPUSCULAR HEMOGLOBIN: 34 PG (ref 27–31.2)
PLATELET # BLD: 124 K/MM3 (ref 142–424)
POTASSIUM: 4.3 MMOL/L (ref 3.5–5.1)
PROT SERPL-MCNC: 6.9 G/DL (ref 6.3–8.2)
PT BLD: 11 SECONDS (ref 10.1–12.5)
RBC # BLD AUTO: 3.68 M/MM3 (ref 4.6–6.2)
SODIUM SPEC-SCNC: 141 MMOL/L (ref 136–145)
WBC # BLD AUTO: 5.5 K/MM3 (ref 4.8–10.8)

## 2023-09-05 PROCEDURE — 99284 EMERGENCY DEPT VISIT MOD MDM: CPT

## 2023-09-05 PROCEDURE — 85610 PROTHROMBIN TIME: CPT

## 2023-09-05 PROCEDURE — 80053 COMPREHEN METABOLIC PANEL: CPT

## 2023-09-05 PROCEDURE — 85025 COMPLETE CBC W/AUTO DIFF WBC: CPT

## 2023-11-30 ENCOUNTER — HOSPITAL ENCOUNTER (OUTPATIENT)
Age: 78
End: 2023-11-30
Payer: MEDICARE

## 2023-11-30 DIAGNOSIS — C64.1: Primary | ICD-10-CM

## 2023-11-30 LAB — MAGNESIUM: 1.7 MG/DL (ref 1.6–2.3)

## 2023-11-30 PROCEDURE — 83735 ASSAY OF MAGNESIUM: CPT

## 2024-07-19 ENCOUNTER — HOSPITAL ENCOUNTER (EMERGENCY)
Age: 79
LOS: 1 days | Discharge: HOME | End: 2024-07-20
Payer: MEDICARE

## 2024-07-19 VITALS
RESPIRATION RATE: 20 BRPM | HEART RATE: 71 BPM | DIASTOLIC BLOOD PRESSURE: 87 MMHG | OXYGEN SATURATION: 97 % | TEMPERATURE: 98.06 F | SYSTOLIC BLOOD PRESSURE: 143 MMHG

## 2024-07-19 VITALS — BODY MASS INDEX: 24.1 KG/M2

## 2024-07-19 DIAGNOSIS — E86.0: Primary | ICD-10-CM

## 2024-07-19 DIAGNOSIS — C64.9: ICD-10-CM

## 2024-07-19 DIAGNOSIS — R53.1: ICD-10-CM

## 2024-07-19 DIAGNOSIS — K94.00: ICD-10-CM

## 2024-07-19 DIAGNOSIS — N18.9: ICD-10-CM

## 2024-07-19 DIAGNOSIS — Z92.21: ICD-10-CM

## 2024-07-19 LAB
ALBUMIN LEVEL: 4.1 G/DL (ref 3.5–5)
ALBUMIN/GLOB SERPL: 1.4 {RATIO} (ref 1.1–1.8)
ALP ISO SERPL-ACNC: 89 U/L (ref 38–126)
ALT SERPLBLD-CCNC: 15 U/L (ref 12–78)
ANION GAP SERPL CALC-SCNC: 12.2 MEQ/L (ref 5–15)
AST SERPL QL: 25 U/L (ref 17–59)
BILIRUBIN,TOTAL: 0.5 MG/DL (ref 0.2–1.3)
BUN SERPL-MCNC: 36 MG/DL (ref 9–20)
CALCIUM SPEC-MCNC: 9 MG/DL (ref 8.4–10.2)
CHLORIDE SPEC-SCNC: 112 MMOL/L (ref 98–107)
CO2 SERPL-SCNC: 19 MMOL/L (ref 22–30)
CREAT BLD-SCNC: 2.8 MG/DL (ref 0.66–1.25)
ESTIMATED GLOMERULAR FILT RATE: 22 ML/MIN (ref 60–?)
GFR (AFRICAN AMERICAN): 27 ML/MIN (ref 60–?)
GLOBULIN SER CALC-MCNC: 2.9 G/DL (ref 1.3–3.2)
GLUCOSE: 77 MG/DL (ref 74–100)
HCT VFR BLD CALC: 42.2 % (ref 42–52)
HGB BLD-MCNC: 13.6 G/DL (ref 14.1–18)
MAGNESIUM: 2.1 MG/DL (ref 1.6–2.3)
MCHC RBC-ENTMCNC: 32.2 G/DL (ref 31.8–35.4)
MCV RBC: 97.8 FL (ref 80–94)
MEAN CORPUSCULAR HEMOGLOBIN: 31.5 PG (ref 27–31.2)
PLATELET # BLD: 184 K/MM3 (ref 142–424)
POTASSIUM: 5.2 MMOL/L (ref 3.5–5.1)
PROCALCITONIN: 0.1 NG/ML (ref 0–2)
PROT SERPL-MCNC: 7 G/DL (ref 6.3–8.2)
RBC # BLD AUTO: 4.31 M/MM3 (ref 4.6–6.2)
SODIUM SPEC-SCNC: 138 MMOL/L (ref 136–145)
WBC # BLD AUTO: 5.3 K/MM3 (ref 4.8–10.8)

## 2024-07-19 PROCEDURE — 84145 PROCALCITONIN (PCT): CPT

## 2024-07-19 PROCEDURE — 80053 COMPREHEN METABOLIC PANEL: CPT

## 2024-07-19 PROCEDURE — 99284 EMERGENCY DEPT VISIT MOD MDM: CPT

## 2024-07-19 PROCEDURE — 83735 ASSAY OF MAGNESIUM: CPT

## 2024-07-19 PROCEDURE — 96360 HYDRATION IV INFUSION INIT: CPT

## 2024-07-19 PROCEDURE — 85025 COMPLETE CBC W/AUTO DIFF WBC: CPT

## 2024-07-20 VITALS
DIASTOLIC BLOOD PRESSURE: 83 MMHG | SYSTOLIC BLOOD PRESSURE: 150 MMHG | HEART RATE: 60 BPM | RESPIRATION RATE: 16 BRPM | TEMPERATURE: 98.06 F | OXYGEN SATURATION: 98 %

## (undated) DEVICE — SINGLE USE SUCTION VALVE MAJ-209: Brand: SINGLE USE SUCTION VALVE (STERILE)

## (undated) DEVICE — BOWL UTIL STRL 32OZ

## (undated) DEVICE — Device: Brand: BALLOON

## (undated) DEVICE — BRUSH CYTO BRONCOSCOPE

## (undated) DEVICE — TRAP,MUCUS SPECIMEN,40CC: Brand: MEDLINE

## (undated) DEVICE — SUT MNCRYL PLS ANTIB UD 4/0 PS2 18IN

## (undated) DEVICE — THE HOBBS MICROBIOLOGY BRUSH IS INTENDED TO BE USED IN COMBINATION WITH A COMPATIBLE FLEXIBLE ENDOSCOPE TO COLLECT CELLS OF THE MUCOSA FOR PATHOLOGICAL DIAGNOSIS DURING ENDOSCOPY. IT IS INSERTED THROUGH THE WORKING CHANNEL OF THE ENDOSCOPE AND CONSISTS OF A FLEXIBLE INSERTION PORTION MADE OF A METAL COIL INSIDE A PLASTIC TUBE, WHOSE DISTAL END IS EQUIPPED WITH PLASTIC BRISTLES FOR HARVESTING AND PROTECTING MUCOSAL CELLS, E.G., DURING ENDOSCOPY. THIS IS A SINGLE-USE DEVICE.: Brand: HOBBS MICROBIOLOGY BRUSH

## (undated) DEVICE — LUER-LOK 360°: Brand: CONNECTA, LUER-LOK

## (undated) DEVICE — ST EXT MICROBORE FIX M LL 38IN

## (undated) DEVICE — SINGLE USE BIOPSY VALVE MAJ-210: Brand: SINGLE USE BIOPSY VALVE (STERILE)